# Patient Record
Sex: FEMALE | Race: OTHER | Employment: FULL TIME | URBAN - METROPOLITAN AREA
[De-identification: names, ages, dates, MRNs, and addresses within clinical notes are randomized per-mention and may not be internally consistent; named-entity substitution may affect disease eponyms.]

---

## 2024-05-14 ENCOUNTER — OFFICE VISIT (OUTPATIENT)
Dept: BARIATRICS | Facility: CLINIC | Age: 34
End: 2024-05-14
Payer: COMMERCIAL

## 2024-05-14 VITALS
BODY MASS INDEX: 32.07 KG/M2 | SYSTOLIC BLOOD PRESSURE: 105 MMHG | HEIGHT: 63 IN | HEART RATE: 70 BPM | WEIGHT: 181 LBS | DIASTOLIC BLOOD PRESSURE: 80 MMHG

## 2024-05-14 DIAGNOSIS — E66.9 OBESITY, CLASS I, BMI 30-34.9: Primary | ICD-10-CM

## 2024-05-14 PROBLEM — Z86.32 HISTORY OF GESTATIONAL DIABETES MELLITUS (GDM): Status: ACTIVE | Noted: 2023-07-27

## 2024-05-14 PROBLEM — F41.8 MIXED ANXIETY DEPRESSIVE DISORDER: Status: ACTIVE | Noted: 2019-02-20

## 2024-05-14 PROCEDURE — 99243 OFF/OP CNSLTJ NEW/EST LOW 30: CPT | Performed by: NURSE PRACTITIONER

## 2024-05-14 RX ORDER — ASCORBIC ACID 500 MG
500 TABLET ORAL DAILY
COMMUNITY

## 2024-05-14 RX ORDER — ZIDOVUDINE 10 MG/ML
SYRUP ORAL
COMMUNITY
Start: 2024-02-14 | End: 2024-05-14 | Stop reason: ALTCHOICE

## 2024-05-14 RX ORDER — EMTRICITABINE AND TENOFOVIR ALAFENAMIDE 200; 25 MG/1; MG/1
1 TABLET ORAL DAILY
COMMUNITY
Start: 2024-05-07

## 2024-05-14 RX ORDER — PNV NO.95/FERROUS FUM/FOLIC AC 28MG-0.8MG
TABLET ORAL
COMMUNITY
End: 2024-05-14 | Stop reason: ALTCHOICE

## 2024-05-14 RX ORDER — DOLUTEGRAVIR SODIUM 50 MG/1
50 TABLET, FILM COATED ORAL DAILY
COMMUNITY
Start: 2024-05-06

## 2024-05-14 RX ORDER — IBUPROFEN 600 MG/1
600 TABLET ORAL 4 TIMES DAILY
COMMUNITY
Start: 2024-02-12 | End: 2024-05-14 | Stop reason: ALTCHOICE

## 2024-05-14 NOTE — PROGRESS NOTES
Assessment/Plan:    Obesity, Class I, BMI 30-34.9  - Discussed options of HealthyCORE-Intensive Lifestyle Intervention Program, Very Low Calorie Diet-VLCD, and Conservative Program and the role of weight loss medications.  - Not a candidate for VLCD due to iron def anemia.   - Explained the importance of making lifestyle changes with anti-obesity medications.  - Patient is interested in pursuing Conservative Program  - Initial weight loss goal of 5-10% weight loss for improved health  - Weight loss can improve patient's co-morbid conditions and/or prevent weight-related complications.  - Stop Bang 1/8  - Interested in weight loss medications to assist with appetite. Delivered baby on Feb 11, 2024. Discussed need to be 6 months postpartum before weight loss medication can be started. She verbalized understanding.   - FDA approved weight loss medications reviewed: Wegovy, Saxenda, Zepbound, Qsymia, Contrave, and Phentermine. Wegovy and Saxenda shortage discussed. Off label use of medications discussed.   Will plan on trying to have her follow-up with provider around Aug 2024 when she would be eligible for weight loss medication.  - Not currently sexually active. Advised that if she becomes sexually active, contraception recommended, such as condoms, as pregnancy not recommended while taking weight loss medications.   - Medication contract signed 5/14/2024.  - Patient denies personal history of pancreatitis. Patient also denies personal and family history of thyroid cancer and multiple endocrine neoplasia type 2 (MEN 2 tumor).   - Patient denies history of kidney stones, gallstones, seizures, or glaucoma.   - Labs reviewed: CBC and CMP 3/25/2024. A1C and TSH 1/12/2024. Hct somewhat low - iron def anemia. Glucose elevated, which will likely improve with weight loss. Remainder of the blood work within acceptable range.  - Check lipid panel.      Goals:  Do not skip meals.  Food log (ie.)  www.myfitnesspal.com,Audioairpeople.com,loseit.com,Qwalytics.com,etc. baritastic (use skinnytaste.com, [place] or smartphone arnoldo [employer] for recipes)  No sugary beverages. At least 64oz of water daily.  Increase physical activity by 10 minutes daily. Gradually increase physical activity to a goal of 5 days per week for 30 minutes of MODERATE intensity PLUS 2 days per week of FULL BODY resistance training (use smartphone apps [employer], Home Workout, etc.)  Start food logging.   5148-8852 calories per day. Sample menu given.   Keep up the great work with water intake.   Start exercise such as walking 2 days per week for 10 minutes and gradually increase to goal of 5 days per week for 30 minutes cardiovascular exercise and 2 days per week resistance training.          Silvia was seen today for consult.    Diagnoses and all orders for this visit:    Obesity, Class I, BMI 30-34.9  -     Lipid panel; Future        Total time spent: 30 min, with >50% face-to-face time spent counseling patient on nonsurgical interventions for the treatment of excess weight. Discussed in detail nonsurgical options including intensive lifestyle intervention program, very low-calorie diet program and conservative program.  Discussed the role of weight loss medications.  Counseled patient on diet behavior and exercise modification for weight loss.        Follow up in approximately  3-4 months  with Non-Surgical Physician/Advanced Practitioner.    Subjective:   Chief Complaint   Patient presents with    Consult     [facility] CONSULT       Patient ID: Silvia Oneal  is a 34 y.o. female with excess weight/obesity here to pursue weight management.  Previous notes and records have been reviewed.    Past Medical History:   Diagnosis Date    HIV infection (HCC) 07/29/2015    Last Assessment & Plan:    Formatting of this note might be different from the original.   Silvia is following at the [facility].  Her most recent viral load was  undetectable therefore she continues to be a candidate for TOLAC without need for intrapartum AZT.  She will continue on Tivicay and Descovy.    Sickle cell trait (HCC) 2015    Formatting of this note might be different from the original.   Hemoglobin electrophoresis ordered for confirmation 23      Last Assessment & Plan:    Formatting of this note might be different from the original.   Confirmed sickle cell trait in patient    Awaiting partners results     Past Surgical History:   Procedure Laterality Date    ABDOMINOPLASTY       SECTION  2009    REDUCTION MAMMAPLASTY         HPI:  Wt Readings from Last 20 Encounters:   24 82.1 kg (181 lb)     Obesity/Excess Weight:  Severity: Mild  Onset:  5 years    Modifiers:  Keto diet  Contributing factors: Stress/Emotional Eating and Insufficient time to make appropriate lifestyle changes  Associated symptoms: comorbid conditions and fatigue    Feels hungry a lot.     Delivered baby 2024. Not breastfeeding.     Hydration: half gallon to 1 gallon water, 1 cup coffee with 2 T sugar and milk, 20 oz diet coke  Alcohol: none  Smoking: denies  Exercise: none  Occupation:    Sleep: 6 hours  STOP ban/8    Highest weight: current  Current weight: 181 lbs BMI 32.06  Goal weight: 135 lbs    Colonoscopy: N/A  Mammogram: N/A    The following portions of the patient's history were reviewed and updated as appropriate: allergies, current medications, past family history, past medical history, past social history, past surgical history, and problem list.    Family History   Problem Relation Age of Onset    Hypertension Father     Breast cancer Paternal Aunt     Pancreatic cancer Maternal Grandfather     Diabetes Paternal Grandmother         Review of Systems   HENT:  Negative for sore throat.    Respiratory:  Negative for cough and shortness of breath.    Cardiovascular:  Negative for chest pain and palpitations.  "  Gastrointestinal:  Negative for abdominal pain, constipation, diarrhea, nausea and vomiting.        Denies GERD   Endocrine: Negative for cold intolerance and heat intolerance.   Genitourinary:  Negative for dysuria.   Musculoskeletal:  Negative for arthralgias and back pain.   Skin:  Negative for rash.   Neurological:  Negative for headaches.   Psychiatric/Behavioral:  Negative for suicidal ideas (or HI).         Denies depression and anxiety       Objective:  /80 (BP Location: Left arm, Patient Position: Sitting, Cuff Size: Standard)   Pulse 70   Ht 5' 3\" (1.6 m)   Wt 82.1 kg (181 lb)   BMI 32.06 kg/m²     Physical Exam  Vitals and nursing note reviewed.        Constitutional   General appearance: Abnormal.  well developed and obese.   Eyes No conjunctival injection.   Ears, Nose, Mouth, and Throat Oral mucosa moist.   Pulmonary   Respiratory effort: No increased work of breathing or signs of respiratory distress.     Cardiovascular     Examination of extremities for edema and/or varicosities: Normal.  no edema.   Abdomen   Abdomen: Abnormal.  The abdomen was obese.    Musculoskeletal   Normal range of motion  Neurological   Gait and station: Normal.    Psychiatric   Orientation to person, place and time: Normal.    Affect: appropriate     "

## 2024-05-15 NOTE — ASSESSMENT & PLAN NOTE
- Discussed options of HealthyCORE-Intensive Lifestyle Intervention Program, Very Low Calorie Diet-VLCD, and Conservative Program and the role of weight loss medications.  - Not a candidate for VLCD due to iron def anemia.   - Explained the importance of making lifestyle changes with anti-obesity medications.  - Patient is interested in pursuing Conservative Program  - Initial weight loss goal of 5-10% weight loss for improved health  - Weight loss can improve patient's co-morbid conditions and/or prevent weight-related complications.  - Stop Bang 1/8  - Interested in weight loss medications to assist with appetite. Delivered baby on Feb 11, 2024. Discussed need to be 6 months postpartum before weight loss medication can be started. She verbalized understanding.   - FDA approved weight loss medications reviewed: Wegovy, Saxenda, Zepbound, Qsymia, Contrave, and Phentermine. Wegovy and Saxenda shortage discussed. Off label use of medications discussed.   Will plan on trying to have her follow-up with provider around Aug 2024 when she would be eligible for weight loss medication.  - Not currently sexually active. Advised that if she becomes sexually active, contraception recommended, such as condoms, as pregnancy not recommended while taking weight loss medications.   - Medication contract signed 5/14/2024.  - Patient denies personal history of pancreatitis. Patient also denies personal and family history of thyroid cancer and multiple endocrine neoplasia type 2 (MEN 2 tumor).   - Patient denies history of kidney stones, gallstones, seizures, or glaucoma.   - Labs reviewed: CBC and CMP 3/25/2024. A1C and TSH 1/12/2024. Hct somewhat low - iron def anemia. Glucose elevated, which will likely improve with weight loss. Remainder of the blood work within acceptable range.  - Check lipid panel.      Goals:  Do not skip meals.  Food log (ie.) www.Babelgum.com,sparkpeople.com,loseit.com,calorieking.com,etc. baritastic (use  [employer], dietdoctor.com or smartphone arnoldo [employer] for recipes)  No sugary beverages. At least 64oz of water daily.  Increase physical activity by 10 minutes daily. Gradually increase physical activity to a goal of 5 days per week for 30 minutes of MODERATE intensity PLUS 2 days per week of FULL BODY resistance training (use smartphone apps [employer], Home Workout, etc.)  Start food logging.   1764-7343 calories per day. Sample menu given.   Keep up the great work with water intake.   Start exercise such as walking 2 days per week for 10 minutes and gradually increase to goal of 5 days per week for 30 minutes cardiovascular exercise and 2 days per week resistance training.